# Patient Record
Sex: MALE | Race: BLACK OR AFRICAN AMERICAN | NOT HISPANIC OR LATINO | Employment: OTHER | URBAN - METROPOLITAN AREA
[De-identification: names, ages, dates, MRNs, and addresses within clinical notes are randomized per-mention and may not be internally consistent; named-entity substitution may affect disease eponyms.]

---

## 2023-11-02 PROBLEM — H90.8 MIXED CONDUCTIVE AND SENSORINEURAL HEARING LOSS: Status: ACTIVE | Noted: 2023-11-02

## 2023-11-02 PROBLEM — H90.A22 SENSORINEURAL HEARING LOSS (SNHL) OF LEFT EAR WITH RESTRICTED HEARING OF RIGHT EAR: Status: ACTIVE | Noted: 2023-11-02

## 2023-11-02 PROBLEM — I10 HYPERTENSION: Status: ACTIVE | Noted: 2023-10-05

## 2023-11-02 PROBLEM — H35.52: Status: ACTIVE | Noted: 2023-01-31

## 2023-11-02 PROBLEM — H92.10 OTORRHEA: Status: ACTIVE | Noted: 2023-11-02

## 2023-11-02 PROBLEM — H61.20 IMPACTED CERUMEN: Status: ACTIVE | Noted: 2023-11-02

## 2023-11-02 PROBLEM — H91.93 BILATERAL HEARING LOSS: Status: ACTIVE | Noted: 2023-11-02

## 2023-11-02 PROBLEM — H90.5 SENSORINEURAL HEARING LOSS (SNHL): Status: ACTIVE | Noted: 2023-11-02

## 2023-11-02 PROBLEM — H70.91: Status: ACTIVE | Noted: 2023-11-02

## 2023-11-02 PROBLEM — D48.5 NEOPLASM OF UNCERTAIN BEHAVIOR OF SKIN: Status: ACTIVE | Noted: 2018-12-11

## 2023-11-02 PROBLEM — H61.23 BILATERAL IMPACTED CERUMEN: Status: ACTIVE | Noted: 2023-11-02

## 2023-11-02 PROBLEM — L72.3 SEBACEOUS CYST: Status: ACTIVE | Noted: 2018-12-11

## 2023-11-02 PROBLEM — H90.71 MIXED HEARING LOSS OF RIGHT EAR: Status: ACTIVE | Noted: 2023-11-02

## 2023-11-02 PROBLEM — H95.191 ENCOUNTER FOR DEBRIDEMENT OF RIGHT POSTMASTOIDECTOMY CAVITY: Status: ACTIVE | Noted: 2023-11-02

## 2023-11-02 PROBLEM — H92.11 OTORRHEA, RIGHT: Status: ACTIVE | Noted: 2023-11-02

## 2023-11-02 RX ORDER — ATORVASTATIN CALCIUM 40 MG/1
1 TABLET, FILM COATED ORAL
COMMUNITY
Start: 2023-10-14

## 2023-11-02 RX ORDER — TAMSULOSIN HYDROCHLORIDE 0.4 MG/1
1 CAPSULE ORAL
COMMUNITY
Start: 2023-09-19

## 2023-11-02 RX ORDER — AMLODIPINE BESYLATE 5 MG/1
TABLET ORAL
COMMUNITY
Start: 2022-11-01

## 2023-11-02 RX ORDER — CHOLECALCIFEROL (VITAMIN D3) 50 MCG
TABLET ORAL
COMMUNITY
Start: 2018-08-02 | End: 2024-03-27 | Stop reason: WASHOUT

## 2023-11-02 RX ORDER — GABAPENTIN 300 MG/1
CAPSULE ORAL
COMMUNITY
Start: 2008-03-19 | End: 2024-03-27 | Stop reason: WASHOUT

## 2023-11-02 RX ORDER — DULOXETIN HYDROCHLORIDE 20 MG/1
CAPSULE, DELAYED RELEASE ORAL
COMMUNITY
Start: 2008-02-19 | End: 2023-11-07 | Stop reason: ALTCHOICE

## 2023-11-05 NOTE — PROGRESS NOTES
Subjective   Patient ID: Chavez Calle is a 43 y.o. male who presents for No chief complaint on file..  HPI  This 43-year-old male is being seen back in the office today for scheduled recheck of his ears for debridement of his right mastoid cavity as well as removal of cerumen from his left ear.  He does have a history of cholesteatoma in the past requiring surgical care.  He is seen at these intervals for debridement to minimize risk for infection.  He does have a mixed hearing loss on his right side and a sensorineural loss on his left.  No active signs of infection have occurred since his last visit.  His last audiogram was 1 year ago.  He is in a postop.  From septoplasty possible turbinate surgery by another ENT provider.  He has splints in place which should be taken out later today.  He was in the emergency room for epistaxis postoperatively.      Review of Systems   HENT:  Positive for hearing loss, nosebleeds and rhinorrhea.    Eyes:  Positive for visual disturbance.       Objective   Physical Exam  EXAMINATION:     GENERAL MIMI.EARANCE: Alert, in no acute distress, normal pitch and clarity of voice, well-developed and nourished, cooperative.     HEAD/FACE: Normocephalic, atraumatic, normal facial movements and strength, no no tenderness to palpation, no lesions noted.     SKIN: Normal turgor, no raised or ulcerative lesions, warm and dry to palpation.     EYES: Extraocular motions intact, no nystagmus noted, pupils equal and reactive to light and accommodation, no conjunctivitis.     EARS: Both external ears were within normal limits. Cerumen was noted in both ears with no signs of otorrhea. No mastoid tenderness was noted. See procedure note     NOSE: No external skin lesions are noted, nares are patent, septum is intact, sinuses are nontender to palpation bilaterally, no intranasal lesions or inflammation is noted, nasal valve is normal.     OROPHARYNX/ORAL CAVITY: Oropharynx is not inflamed and is  without lesions, mucosa of the oral cavity is intact and without lesions, tongue is midline and mobile, no acute dental disease is noted, TMJs are mobile     NECK: No lymphadenopathy is palpated, carotid pulses are intact, neck is supple with full range of motion, no thyroid abnormalities are noted, trachea is midline, no neck masses are palpated.     NEUROLOGIC/PSYCH; alert and oriented, cranial nerves are grossly intact, gait is without falling, no motor deficits are noted.    Patient ID: Chavez Calle is a 43 y.o. male.    Ear cerumen removal    Date/Time: 11/5/2023 8:57 AM    Performed by: Kyler Escoto DMD, MD  Authorized by: Kyler Escoto DMD, MD    Consent:     Consent obtained:  Verbal    Consent given by:  Patient    Risks discussed:  Pain and incomplete removal    Alternatives discussed:  No treatment and alternative treatment  Universal protocol:     Procedure explained and questions answered to patient or proxy's satisfaction: yes      Imaging studies available: no      Required blood products, implants, devices, and special equipment available: no      Patient identity confirmed:  Verbally with patient  Procedure details:     Location:  L ear and R ear    Procedure type: curette      Procedure type comment:  Or suction    Procedure outcomes: cerumen removed    Post-procedure details:     Inspection:  No bleeding and ear canal clear    Hearing quality:  Improved    Procedure completion:  Tolerated  Comments:      Microscopic examination of his right ear revealed evidence for significant ceruminous obstruction which was removed with wax loop.  The tympanic membrane was intact with no signs of middle ear disease.  On the left-hand side there was ceruminous debris obstructive to the mastoid cavity.  He status post a canal wall down mastoidectomy in the past.  This was removed with suction alligator forceps curved blunt hook.  There was a tympanic membrane intact.    His audiogram today on the left  ear continues to show a mostly mild sensorineural hearing loss in the mid upper frequencies of sound.  And on the right-hand side there was a moderate mixed hearing loss in the mid to upper frequencies of sound.  Excellent discrimination was still noted in both ears.  No change was noted    Assessment/Plan   Problem List Items Addressed This Visit             ICD-10-CM    Encounter for debridement of right postmastoidectomy cavity - Primary H95.191    Mixed hearing loss of right ear H90.71    Sensorineural hearing loss (SNHL) of left ear with restricted hearing of right ear H90.A22    Impacted cerumen H61.20     I discussed the findings with the patient. Do to the history of cerumen impactions, avoidance of Q tip use and water contamination is advised. Periodic check ups in the office or with the PCP are advised and if recurrent obstructions are noted a scheduled cleaning schedule will be maintained. Ear pain, otorhea, changes in hearing should be reported to the office. For some the use of debrox or baby oil can be helpful as long as lorri TM is intact and not perforated.    I discussed the present hearing test findings with the patient. Since the last test there has been no significant change in the hearing of individual frequencies sound. Discrimination ability remains basically unchanged. It would be advised that a yearly audiogram be done unless symptoms develop in regards to progressive loss, new onset vertigo, or changes regarding tinnitus. Avoidance of loud noise without ear protection is advised. Rehabilitation using hearing aids can at some point be of benefit to him but at the present time he seems to be functioning around this loss.  Recheck in 6 months is recommended.

## 2023-11-07 ENCOUNTER — CLINICAL SUPPORT (OUTPATIENT)
Dept: AUDIOLOGY | Facility: CLINIC | Age: 43
End: 2023-11-07
Payer: MEDICARE

## 2023-11-07 ENCOUNTER — OFFICE VISIT (OUTPATIENT)
Dept: OTOLARYNGOLOGY | Facility: CLINIC | Age: 43
End: 2023-11-07
Payer: MEDICARE

## 2023-11-07 DIAGNOSIS — H90.3 SENSORINEURAL HEARING LOSS, BILATERAL: Primary | ICD-10-CM

## 2023-11-07 DIAGNOSIS — H90.A22 SENSORINEURAL HEARING LOSS (SNHL) OF LEFT EAR WITH RESTRICTED HEARING OF RIGHT EAR: ICD-10-CM

## 2023-11-07 DIAGNOSIS — H90.A31 MIXED CONDUCTIVE AND SENSORINEURAL HEARING LOSS OF RIGHT EAR WITH RESTRICTED HEARING OF LEFT EAR: ICD-10-CM

## 2023-11-07 DIAGNOSIS — H95.191 ENCOUNTER FOR DEBRIDEMENT OF RIGHT POSTMASTOIDECTOMY CAVITY: Primary | ICD-10-CM

## 2023-11-07 DIAGNOSIS — H61.23 BILATERAL IMPACTED CERUMEN: ICD-10-CM

## 2023-11-07 DIAGNOSIS — H61.22 IMPACTED CERUMEN OF LEFT EAR: ICD-10-CM

## 2023-11-07 PROCEDURE — 92557 COMPREHENSIVE HEARING TEST: CPT | Performed by: AUDIOLOGIST

## 2023-11-07 PROCEDURE — 3079F DIAST BP 80-89 MM HG: CPT | Performed by: OTOLARYNGOLOGY

## 2023-11-07 PROCEDURE — 4004F PT TOBACCO SCREEN RCVD TLK: CPT | Performed by: OTOLARYNGOLOGY

## 2023-11-07 PROCEDURE — 99214 OFFICE O/P EST MOD 30 MIN: CPT | Performed by: OTOLARYNGOLOGY

## 2023-11-07 PROCEDURE — 69222 CLEAN OUT MASTOID CAVITY: CPT | Performed by: OTOLARYNGOLOGY

## 2023-11-07 PROCEDURE — 69210 REMOVE IMPACTED EAR WAX UNI: CPT | Performed by: OTOLARYNGOLOGY

## 2023-11-07 PROCEDURE — 3074F SYST BP LT 130 MM HG: CPT | Performed by: OTOLARYNGOLOGY

## 2023-11-07 RX ORDER — TRAZODONE HYDROCHLORIDE 50 MG/1
50 TABLET ORAL NIGHTLY PRN
COMMUNITY
Start: 2023-01-18

## 2023-11-07 RX ORDER — DULOXETIN HYDROCHLORIDE 30 MG/1
30 CAPSULE, DELAYED RELEASE ORAL DAILY
COMMUNITY
Start: 2023-10-27 | End: 2024-03-27 | Stop reason: WASHOUT

## 2023-11-07 ASSESSMENT — ENCOUNTER SYMPTOMS: RHINORRHEA: 1

## 2023-11-07 NOTE — PROGRESS NOTES
COMPREHENSIVE AUDIOMETRIC EVALUATION      Name:  Chavez Calle  :  1980  Age:  43 y.o.  Date of Evaluation:  23   Referring Provider:  Dr. Escoto     History:  Mr. Calle was seen today for an evaluation of hearing.  Please recall, patient has been a patient of our clinic for monitoring of asymmetric high frequency thresholds.  Patient denied any new concerns     OTOSCOPY: Did not conduct as immediately following Dr. Escoto cerumen removal    AUDIOMETRIC EVALUATION (Phones):       Right Ear: Mild sloping to Moderately severe Sensorineural hearing loss                 Left Ear: Normal sloping to Mild Sensorineural hearing loss         NOTE: Hearing testing consistent with most recent audiometric evaluation 2022; Clinically significant high frequency asymmetric SNHL    Test technique:  Standard Audiometry  Reliability:   good    SPEECH RECOGNITION THRESHOLD:       Right Ear:  20 dBHL in fair with PTA       Left Ear:  20 dBHL in good with PTA    WORD RECOGNITION:       Right Ear:  excellent (96%) at elevated presentation level       Left Ear:  excellent (100%) at normal presentation level    RECOMMENDATIONS:  -Recommend further investigation into asymmetric hearing sensitivity has this not already been conducted  -Recommend patient be seen for HAE for the right ear following medical clearance  -Recommend patient return for audiometric evaluation in approximately a year or sooner should concerns arise.    Heron Singh, CCC-A     Appt: 10:30 - 11:00 AM

## 2023-12-11 PROCEDURE — 88304 TISSUE EXAM BY PATHOLOGIST: CPT | Performed by: PATHOLOGY

## 2023-12-11 PROCEDURE — 88304 TISSUE EXAM BY PATHOLOGIST: CPT

## 2023-12-11 PROCEDURE — 0752T DGTZ GLS MCRSCP SLD LVL III: CPT

## 2023-12-13 ENCOUNTER — LAB REQUISITION (OUTPATIENT)
Dept: LAB | Facility: HOSPITAL | Age: 43
End: 2023-12-13
Payer: MEDICARE

## 2023-12-13 DIAGNOSIS — G47.33 OBSTRUCTIVE SLEEP APNEA (ADULT) (PEDIATRIC): ICD-10-CM

## 2023-12-21 LAB
LABORATORY COMMENT REPORT: NORMAL
PATH REPORT.FINAL DX SPEC: NORMAL
PATH REPORT.GROSS SPEC: NORMAL
PATH REPORT.TOTAL CANCER: NORMAL

## 2024-03-23 NOTE — PROGRESS NOTES
Subjective   Patient ID: Chavez Calle is a 43 y.o. male who presents for No chief complaint on file..  HPI  This 43-year-old male is being seen back in the office today for scheduled recheck of his ears for debridement of his right mastoid cavity as well as removal of cerumen from his left ear. He does have a history of cholesteatoma in the past requiring surgical care. He is seen at these intervals for debridement to minimize risk for infection. He does have a mixed hearing loss on his right side and a sensorineural loss on his left. No active signs of infection have occurred since his last visit. He did in the fall 2023 undergo a septoplasty and turbinate reduction by another provider.  He is also scheduled for surgery for inspire to treat his sleep apnea.  He is having no otologic issues.  His hearing was last checked in November of last year.  Review of Systems  Current review of systems  Active Ambulatory Problems     Diagnosis Date Noted    Bilateral impacted cerumen 11/02/2023    Cholesteatoma of middle ear and mastoid 03/03/2008    Chronic mastoiditis 02/19/2008    Encounter for debridement of right postmastoidectomy cavity 11/02/2023    Infection of mastoid bowl, right 11/02/2023    Mixed hearing loss of right ear 11/02/2023    Neoplasm of uncertain behavior of skin 12/11/2018    Otorrhea 11/02/2023    Sebaceous cyst 12/11/2018    Sensorineural hearing loss (SNHL) of left ear with restricted hearing of right ear 11/02/2023    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    Hypertension 10/05/2023    Impacted cerumen 11/02/2023    Mixed conductive and sensorineural hearing loss 11/02/2023    Sensorineural hearing loss (SNHL) 11/02/2023    Bilateral hearing loss 11/02/2023     Resolved Ambulatory Problems     Diagnosis Date Noted    No Resolved Ambulatory Problems     Past Medical History:   Diagnosis Date    Personal history of other diseases of the respiratory system      Personal history of other drug therapy            Current Outpatient Medications:     amLODIPine (Norvasc) 5 mg tablet, 1 tablet Orally Once a day for 30 days, Disp: , Rfl:     atorvastatin (Lipitor) 40 mg tablet, Take 1 tablet (40 mg) by mouth once daily., Disp: , Rfl:     buPROPion (Wellbutrin) 37.5 MG split tablet, Take 2 half tablet (75 mg) by mouth., Disp: , Rfl:     cholecalciferol (Vitamin D-3) 50 MCG (2000 UT) tablet, Take by mouth., Disp: , Rfl:     DULoxetine (Cymbalta) 30 mg DR capsule, Take 1 capsule (30 mg) by mouth once daily., Disp: , Rfl:     gabapentin (Neurontin) 300 mg capsule, Take by mouth., Disp: , Rfl:     OMEGA-3 ACID ETHYL ESTERS ORAL, Take by mouth., Disp: , Rfl:     tamsulosin (Flomax) 0.4 mg 24 hr capsule, Take 1 capsule (0.4 mg) by mouth once daily., Disp: , Rfl:     traZODone (Desyrel) 50 mg tablet, Take 1 tablet (50 mg) by mouth as needed at bedtime for sleep., Disp: , Rfl:      Social History     Tobacco Use    Smoking status: Every Day     Types: Cigarettes    Smokeless tobacco: Never   Substance Use Topics    Alcohol use: Not Currently        Past Surgical History:   Procedure Laterality Date    OTHER SURGICAL HISTORY  04/19/2016    Ear Surgery    OTHER SURGICAL HISTORY  04/19/2016    Oral Surgery      There were no vitals taken for this visit.   Objective   Physical Exam  EXAMINATION:     GENERAL MIMI.EARANCE: Alert, in no acute distress, normal pitch and clarity of voice, well-developed and nourished, cooperative.     HEAD/FACE: Normocephalic, atraumatic, normal facial movements and strength, no no tenderness to palpation, no lesions noted.     SKIN: Normal turgor, no raised or ulcerative lesions, warm and dry to palpation.     EYES: Extraocular motions intact, no nystagmus noted, pupils equal and reactive to light and accommodation, no conjunctivitis.     EARS: Both external ears were within normal limits. Cerumen was noted in both ears with no signs of otorrhea. No mastoid  tenderness was noted. See procedure note     NOSE: No external skin lesions are noted, nares are patent, septum is intact, sinuses are nontender to palpation bilaterally, no intranasal lesions or inflammation is noted, nasal valve is normal.     OROPHARYNX/ORAL CAVITY: Oropharynx is not inflamed and is without lesions, mucosa of the oral cavity is intact and without lesions, tongue is midline and mobile, no acute dental disease is noted, TMJs are mobile     NECK: No lymphadenopathy is palpated, carotid pulses are intact, neck is supple with full range of motion, no thyroid abnormalities are noted, trachea is midline, no neck masses are palpated.     NEUROLOGIC/PSYCH; alert and oriented, cranial nerves are grossly intact, gait is without falling, no motor deficits are noted.    Patient ID: Chavez Calle is a 43 y.o. male.    Ear cerumen removal    Date/Time: 3/27/2024 9:09 AM    Performed by: Kyler Escoto DMD, MD  Authorized by: Kyler Escoto DMD, MD    Consent:     Consent obtained:  Verbal    Consent given by:  Patient    Risks discussed:  Pain and incomplete removal    Alternatives discussed:  No treatment and alternative treatment  Universal protocol:     Procedure explained and questions answered to patient or proxy's satisfaction: yes      Imaging studies available: no      Required blood products, implants, devices, and special equipment available: no      Patient identity confirmed:  Verbally with patient  Procedure details:     Location:  L ear and R ear    Procedure type: curette      Procedure type comment:  Or suction    Procedure outcomes: cerumen removed    Post-procedure details:     Inspection:  No bleeding and ear canal clear    Hearing quality:  Improved    Procedure completion:  Tolerated well, no immediate complications  Comments:      Microscopic evaluation of the right ear was done.  There was heavy ceruminous buildup noted within the mastoid cavity and in the anterior aspect of the  ear.  This was debrided using curette primarily.  Alligator forceps was utilized as well.  The mastoid cavity was clean with no otorrhea.  The anterior aspect of the cavity over the facial ridge was without abnormalities.  There is a tympanic membrane present with no signs of acute inflammation.  On the left-hand side cerumen is buildup is noted obstructed to the ear which is removed with a curette.  The tympanic membrane was intact.    Assessment/Plan   Problem List Items Addressed This Visit             ICD-10-CM    Bilateral impacted cerumen - Primary H61.23    Encounter for debridement of right postmastoidectomy cavity H95.191    Mixed hearing loss of right ear H90.71    Sensorineural hearing loss (SNHL) of left ear with restricted hearing of right ear H90.A22     I discussed the clinical finds the patient.  He should stay in a 6-month checkup schedule due to his tendency for obstruction in the mastoid cavity on the right.  He had no signs of otorrhea or difficulties with infections.  He is still actively undergoing treatments for his sleep apnea and is scheduled for inspire placement in the near future.  His hearing test is up-to-date and has had no changes noted subjectively.  Recheck in 6 months is recommended sooner if he has any drainage problems or difficulties with pain or sudden changes in hearing.       Kyler Escoto DMD, MD 03/23/24 9:59 AM

## 2024-03-27 ENCOUNTER — CLINICAL SUPPORT (OUTPATIENT)
Dept: AUDIOLOGY | Facility: CLINIC | Age: 44
End: 2024-03-27
Payer: MEDICARE

## 2024-03-27 ENCOUNTER — OFFICE VISIT (OUTPATIENT)
Dept: OTOLARYNGOLOGY | Facility: CLINIC | Age: 44
End: 2024-03-27
Payer: MEDICARE

## 2024-03-27 VITALS — BODY MASS INDEX: 31.16 KG/M2 | WEIGHT: 256 LBS

## 2024-03-27 DIAGNOSIS — H90.A22 SENSORINEURAL HEARING LOSS (SNHL) OF LEFT EAR WITH RESTRICTED HEARING OF RIGHT EAR: ICD-10-CM

## 2024-03-27 DIAGNOSIS — H95.191 ENCOUNTER FOR DEBRIDEMENT OF RIGHT POSTMASTOIDECTOMY CAVITY: ICD-10-CM

## 2024-03-27 DIAGNOSIS — H90.A31 MIXED CONDUCTIVE AND SENSORINEURAL HEARING LOSS OF RIGHT EAR WITH RESTRICTED HEARING OF LEFT EAR: ICD-10-CM

## 2024-03-27 DIAGNOSIS — H61.23 BILATERAL IMPACTED CERUMEN: Primary | ICD-10-CM

## 2024-03-27 PROCEDURE — 69210 REMOVE IMPACTED EAR WAX UNI: CPT | Performed by: OTOLARYNGOLOGY

## 2024-03-27 PROCEDURE — 99213 OFFICE O/P EST LOW 20 MIN: CPT | Performed by: OTOLARYNGOLOGY

## 2024-03-27 PROCEDURE — 69220 CLEAN OUT MASTOID CAVITY: CPT | Performed by: OTOLARYNGOLOGY

## 2024-03-27 RX ORDER — DULOXETINE 40 MG/1
40 CAPSULE, DELAYED RELEASE ORAL DAILY
COMMUNITY
Start: 2024-03-04

## 2024-03-27 ASSESSMENT — PATIENT HEALTH QUESTIONNAIRE - PHQ9
SUM OF ALL RESPONSES TO PHQ9 QUESTIONS 1 AND 2: 0
1. LITTLE INTEREST OR PLEASURE IN DOING THINGS: NOT AT ALL
2. FEELING DOWN, DEPRESSED OR HOPELESS: NOT AT ALL

## 2024-03-27 NOTE — PROGRESS NOTES
Patient was scheduled for audiometric evaluation today, however, Dr. Escoto and patient discussed and decided to defer evaluation. Patient was not seen for audiometric evaluation today.    Heron Singh, CCC-A

## 2024-05-06 NOTE — PROGRESS NOTES
Subjective   Patient ID: Chavez Calle is a 43 y.o. male who presents for No chief complaint on file..  HPI  This 43-year-old male is being seen back in the office today for scheduled recheck of his ears for debridement of his right mastoid cavity as well as removal of cerumen from his left ear. He does have a history of cholesteatoma in the past requiring surgical care. He is seen at these intervals for debridement to minimize risk for infection. He does have a mixed hearing loss on his right side and a sensorineural loss on his left. No active signs of infection have occurred since his last visit. He did in the fall 2023 undergo a septoplasty and turbinate reduction by another provider.  He is also scheduled for surgery for inspire to treat his sleep apnea.  He is having no otologic issues.  His hearing was last checked in November of last year.     Review of Systems  A 12 point ROS has been reviewed and are negative for complaint except what is stated in the history of present illness and/or past medical history as noted in the EMR  Active Ambulatory Problems     Diagnosis Date Noted    Bilateral impacted cerumen 11/02/2023    Cholesteatoma of middle ear and mastoid 03/03/2008    Chronic mastoiditis 02/19/2008    Encounter for debridement of right postmastoidectomy cavity 11/02/2023    Infection of mastoid bowl, right 11/02/2023    Mixed hearing loss of right ear 11/02/2023    Neoplasm of uncertain behavior of skin 12/11/2018    Otorrhea 11/02/2023    Sebaceous cyst 12/11/2018    Sensorineural hearing loss (SNHL) of left ear with restricted hearing of right ear 11/02/2023    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    Hypertension 10/05/2023    Impacted cerumen 11/02/2023    Mixed conductive and sensorineural hearing loss 11/02/2023    Sensorineural hearing loss (SNHL) 11/02/2023    Bilateral hearing loss 11/02/2023     Resolved Ambulatory Problems     Diagnosis Date Noted     No Resolved Ambulatory Problems     Past Medical History:   Diagnosis Date    Personal history of other diseases of the respiratory system     Personal history of other drug therapy           Current Outpatient Medications:     amLODIPine (Norvasc) 5 mg tablet, 1 tablet Orally Once a day for 30 days, Disp: , Rfl:     atorvastatin (Lipitor) 40 mg tablet, Take 1 tablet (40 mg) by mouth once daily., Disp: , Rfl:     DULoxetine 40 mg DR capsule, Take 1 capsule (40 mg) by mouth once daily., Disp: , Rfl:     tamsulosin (Flomax) 0.4 mg 24 hr capsule, Take 1 capsule (0.4 mg) by mouth once daily., Disp: , Rfl:     traZODone (Desyrel) 50 mg tablet, Take 1 tablet (50 mg) by mouth as needed at bedtime for sleep., Disp: , Rfl:      Social History     Tobacco Use    Smoking status: Every Day     Types: Cigarettes    Smokeless tobacco: Never   Substance Use Topics    Alcohol use: Not Currently        No Known Allergies   Objective   Physical Exam  EXAMINATION:     GENERAL MIMI.EARANCE: Alert, in no acute distress, normal pitch and clarity of voice, well-developed and nourished, cooperative.     HEAD/FACE: Normocephalic, atraumatic, normal facial movements and strength, no no tenderness to palpation, no lesions noted.     SKIN: Normal turgor, no raised or ulcerative lesions, warm and dry to palpation.     EYES: Extraocular motions intact, no nystagmus noted, pupils equal and reactive to light and accommodation, no conjunctivitis.     EARS: Both external ears were within normal limits. Cerumen was noted in both ears with no signs of otorrhea. No mastoid tenderness was noted. See procedure note     NOSE: No external skin lesions are noted, nares are patent, septum is intact, sinuses are nontender to palpation bilaterally, no intranasal lesions or inflammation is noted, nasal valve is normal.     OROPHARYNX/ORAL CAVITY: Oropharynx is not inflamed and is without lesions, mucosa of the oral cavity is intact and without lesions, tongue  is midline and mobile, no acute dental disease is noted, TMJs are mobile     NECK: No lymphadenopathy is palpated, carotid pulses are intact, neck is supple with full range of motion, no thyroid abnormalities are noted, trachea is midline, no neck masses are palpated.     NEUROLOGIC/PSYCH; alert and oriented, cranial nerves are grossly intact, gait is without falling, no motor deficits are noted.    Patient ID: Chavez Calle is a 43 y.o. male.    Procedures  Assessment/Plan   {Assess/PlanSmartLinks:[id number]}         Kyler Escoto DMD, MD 05/06/24 2:25 PM

## 2024-05-13 ENCOUNTER — APPOINTMENT (OUTPATIENT)
Dept: OTOLARYNGOLOGY | Facility: CLINIC | Age: 44
End: 2024-05-13
Payer: MEDICARE

## 2024-05-13 DIAGNOSIS — H90.A31 MIXED CONDUCTIVE AND SENSORINEURAL HEARING LOSS OF RIGHT EAR WITH RESTRICTED HEARING OF LEFT EAR: ICD-10-CM

## 2024-05-13 DIAGNOSIS — H61.23 BILATERAL IMPACTED CERUMEN: Primary | ICD-10-CM

## 2024-05-13 DIAGNOSIS — H95.191 ENCOUNTER FOR DEBRIDEMENT OF RIGHT POSTMASTOIDECTOMY CAVITY: ICD-10-CM

## 2024-05-13 DIAGNOSIS — H90.A22 SENSORINEURAL HEARING LOSS (SNHL) OF LEFT EAR WITH RESTRICTED HEARING OF RIGHT EAR: ICD-10-CM

## 2024-09-27 NOTE — PROGRESS NOTES
Subjective   Patient ID: Chavez Calle is a 43 y.o. male who presents for No chief complaint on file..  HPI  This 43-year-old male is being seen back in the office today for scheduled recheck of his ears for debridement of his right mastoid cavity as well as removal of cerumen from his left ear. He does have a history of cholesteatoma in the past requiring surgical care. He is seen at these intervals for debridement to minimize risk for infection. He does have a mixed hearing loss on his right side and a sensorineural loss on his left. No active signs of infection have occurred since his last visit.  He is status post nasal surgery by another provider and he was assessed for inspire for treatment of sleep apnea.  Review of Systems   A 12 point ROS  has been reviewed and are negative for complaint except for what is stated in the history of present illness and /or for past medical history as noted in the EMR.     Past Medical History:   Diagnosis Date    Personal history of other diseases of the respiratory system     History of asthma    Personal history of other drug therapy     COVID-19 vaccine series completed          Current Outpatient Medications:     amLODIPine (Norvasc) 5 mg tablet, 1 tablet Orally Once a day for 30 days, Disp: , Rfl:     atorvastatin (Lipitor) 40 mg tablet, Take 1 tablet (40 mg) by mouth once daily., Disp: , Rfl:     DULoxetine 40 mg DR capsule, Take 1 capsule (40 mg) by mouth once daily., Disp: , Rfl:     tamsulosin (Flomax) 0.4 mg 24 hr capsule, Take 1 capsule (0.4 mg) by mouth once daily., Disp: , Rfl:     traZODone (Desyrel) 50 mg tablet, Take 1 tablet (50 mg) by mouth as needed at bedtime for sleep., Disp: , Rfl:      Social History     Tobacco Use    Smoking status: Every Day     Types: Cigarettes    Smokeless tobacco: Never   Substance Use Topics    Alcohol use: Not Currently       No Known Allergies    There were no vitals taken for this visit.    Objective   Physical Exam  GENERAL  MIMI.EARANCE: Alert, in no acute distress, normal pitch and clarity of voice, well-developed and nourished, cooperative.     HEAD/FACE: Normocephalic, atraumatic, normal facial movements and strength, no no tenderness to palpation, no lesions noted.     SKIN: Normal turgor, no raised or ulcerative lesions, warm and dry to palpation.     EYES: Extraocular motions intact, no nystagmus noted, pupils equal and reactive to light and accommodation, no conjunctivitis.     EARS: Both external ears were within normal limits. Cerumen was noted in both ears with no signs of otorrhea. No mastoid tenderness was noted. See procedure note     NOSE: See procedure note    OROPHARYNX/ORAL CAVITY: Oropharynx is not inflamed and is without lesions, mucosa of the oral cavity is intact and without lesions, tongue is midline and mobile, no acute dental disease is noted, TMJs are mobile     NECK: No lymphadenopathy is palpated, carotid pulses are intact, neck is supple with full range of motion, no thyroid abnormalities are noted, trachea is midline, no neck masses are palpated.     NEUROLOGIC/PSYCH; alert and oriented, cranial nerves are grossly intact, gait is without falling, no motor deficits are noted.    Patient ID: Chavez Calle is a 43 y.o. male.    Ear cerumen removal    Date/Time: 10/1/2024 11:59 AM    Performed by: Kyler Escoto DMD, MD  Authorized by: Kyler Escoto DMD, MD    Consent:     Consent obtained:  Verbal    Consent given by:  Patient    Risks discussed:  Pain and incomplete removal    Alternatives discussed:  No treatment and alternative treatment  Universal protocol:     Procedure explained and questions answered to patient or proxy's satisfaction: yes      Imaging studies available: no      Required blood products, implants, devices, and special equipment available: no      Patient identity confirmed:  Verbally with patient  Procedure details:     Location:  L ear and R ear    Procedure type: curette       Procedure type comment:  Or suction    Procedure outcomes: cerumen removed    Post-procedure details:     Inspection:  No bleeding and ear canal clear    Hearing quality:  Improved    Procedure completion:  Tolerated well, no immediate complications  Comments:      Microscopic evaluation was done in both ears.  On the right-hand side he has a canal wall down mastoid cavity in which there was ceruminous buildup.  This was removed with a curette and 90 degree ball probe.  The cerumen was removed completely and there was no signs of otorrhea or signs of inflammation.  There is a tympanic membrane still present with no signs of inflammation or middle ear disease.  On the left-hand side there was ceruminous debris obstructive to the ear canal which was removed primarily with a curette.  The tympanic membrane was intact with no signs of middle ear disease.       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  Ceruminous debris is removed in both ears especially from the mastoid cavity on the right.  There is no sign of acute inflammation or infection.  He should remain on a 4-month checkup schedule and at his next visit an audiogram should be obtained.  He should wear his hearing aids to improve his function.  He seems to have had a good response to treatment of his sleep apnea with inspire.  The passing difficulties prior to his scheduled visit he should return to the office for an earlier visit.       Kyler Escoto DMD, MD 09/27/24 9:48 AM

## 2024-10-01 ENCOUNTER — APPOINTMENT (OUTPATIENT)
Dept: OTOLARYNGOLOGY | Facility: CLINIC | Age: 44
End: 2024-10-01
Payer: MEDICARE

## 2024-10-01 VITALS — WEIGHT: 256 LBS | BODY MASS INDEX: 31.17 KG/M2 | HEIGHT: 76 IN

## 2024-10-01 DIAGNOSIS — H95.191 ENCOUNTER FOR DEBRIDEMENT OF RIGHT POSTMASTOIDECTOMY CAVITY: ICD-10-CM

## 2024-10-01 DIAGNOSIS — H61.23 BILATERAL IMPACTED CERUMEN: Primary | ICD-10-CM

## 2024-10-01 DIAGNOSIS — H90.A31 MIXED CONDUCTIVE AND SENSORINEURAL HEARING LOSS OF RIGHT EAR WITH RESTRICTED HEARING OF LEFT EAR: ICD-10-CM

## 2024-10-01 DIAGNOSIS — H90.A22 SENSORINEURAL HEARING LOSS (SNHL) OF LEFT EAR WITH RESTRICTED HEARING OF RIGHT EAR: ICD-10-CM

## 2024-10-01 PROCEDURE — 69222 CLEAN OUT MASTOID CAVITY: CPT | Performed by: OTOLARYNGOLOGY

## 2024-10-01 PROCEDURE — 3008F BODY MASS INDEX DOCD: CPT | Performed by: OTOLARYNGOLOGY

## 2024-10-01 PROCEDURE — 99213 OFFICE O/P EST LOW 20 MIN: CPT | Performed by: OTOLARYNGOLOGY

## 2024-10-01 PROCEDURE — 69210 REMOVE IMPACTED EAR WAX UNI: CPT | Performed by: OTOLARYNGOLOGY

## 2025-01-31 NOTE — PROGRESS NOTES
Subjective   Patient ID: Chavez Calle is a 44 y.o. male who presents for No chief complaint on file..  HPI  This 44-year-old male is being seen back in the office today for scheduled recheck of his ears for debridement of his right mastoid cavity as well as removal of cerumen from his left ear. He does have a history of cholesteatoma in the past requiring surgical care. He is seen at these intervals for debridement to minimize risk for infection. He does have a mixed hearing loss on his right side and a sensorineural loss on his left. No active signs of infection have occurred since his last visit.  He is status post nasal surgery by another provider and he was assessed for inspire for treatment of sleep apnea.   Review of Systems   A 12 point ROS  has been reviewed and are negative for complaint except for what is stated in the history of present illness and /or for past medical history as noted in the EMR.    Past Medical History:   Diagnosis Date    Personal history of other diseases of the respiratory system     History of asthma    Personal history of other drug therapy     COVID-19 vaccine series completed          Current Outpatient Medications:     amLODIPine (Norvasc) 5 mg tablet, 1 tablet Orally Once a day for 30 days, Disp: , Rfl:     atorvastatin (Lipitor) 40 mg tablet, Take 1 tablet (40 mg) by mouth once daily., Disp: , Rfl:     DULoxetine 40 mg DR capsule, Take 1 capsule (40 mg) by mouth once daily., Disp: , Rfl:     tamsulosin (Flomax) 0.4 mg 24 hr capsule, Take 1 capsule (0.4 mg) by mouth once daily., Disp: , Rfl:     traZODone (Desyrel) 50 mg tablet, Take 1 tablet (50 mg) by mouth as needed at bedtime for sleep., Disp: , Rfl:      Past Surgical History:   Procedure Laterality Date    OTHER SURGICAL HISTORY  04/19/2016    Ear Surgery    OTHER SURGICAL HISTORY  04/19/2016    Oral Surgery        Social History     Tobacco Use    Smoking status: Every Day     Types: Cigarettes    Smokeless tobacco:  Never   Substance Use Topics    Alcohol use: Not Currently       No Known Allergies    There were no vitals taken for this visit.    Objective   Physical Exam  GENERAL MIMI.EARANCE: Alert, in no acute distress, normal pitch and clarity of voice, well-developed and nourished, cooperative.     HEAD/FACE: Normocephalic, atraumatic, normal facial movements and strength, no no tenderness to palpation, no lesions noted.     SKIN: Normal turgor, no raised or ulcerative lesions, warm and dry to palpation.     EYES: Extraocular motions intact, no nystagmus noted, pupils equal and reactive to light and accommodation, no conjunctivitis.     EARS: Both external ears were within normal limits. Cerumen was noted in both ears with no signs of otorrhea. No mastoid tenderness was noted. See procedure note     NOSE: See procedure note     OROPHARYNX/ORAL CAVITY: Oropharynx is not inflamed and is without lesions, mucosa of the oral cavity is intact and without lesions, tongue is midline and mobile, no acute dental disease is noted, TMJs are mobile     NECK: No lymphadenopathy is palpated, carotid pulses are intact, neck is supple with full range of motion, no thyroid abnormalities are noted, trachea is midline, no neck masses are palpated.     NEUROLOGIC/PSYCH; alert and oriented, cranial nerves are grossly intact, gait is without falling, no motor deficits are noted.       Patient ID: Chavez Calle is a 44 y.o. male.    Binocular microscopy    Date/Time: 2/5/2025 11:23 AM    Performed by: Kyler Escoto DMD, MD  Authorized by: Kyler Escoto DMD, MD    Consent:     Consent obtained:  Verbal    Consent given by:  Patient    Risks discussed:  Pain    Alternatives discussed:  No treatment and observation  Procedure details:     Indications: debridement  (Mastoid debridement of right ear cerumen removal left)  Post-procedure details:     Patient tolerance of procedure:  Tolerated well, no immediate complications  Comments:       Microscopic evaluation of both ears was done.  On the right-hand side there was a significant amount of ceruminous debris obstructive to the ear canal and the mastoid bowl.  This was removed with commendation of curette and alligator forceps.  Once removed there was no signs of otorrhea or inflammation.  Boric acid powder was applied.  On the left-hand side slight narrowness to the ear canal is noted with significant obstructive ceruminous debris which was removed with a wax loop primarily.  The tympanic membrane was intact there was no signs of inflammation.    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  His exam was negative for signs of inflammation in the ears in particular.  His nasal exam did not show evidence for purulent nasal discharge and the septum is midline.  His nasal obstruction that he still feels is likely secondary to his turbinate fluctuation in size.  This could also be secondary to smoking.  He is going to see his other provider to have that checked out further.    The standpoint of his ears there is no signs of infections or inflammation.  It is feasible that the low tone changes in hearing are secondary to the degree of cerumen buildup since it was significant and likely the test should have been done after debridement.    I discussed the present hearing test findings with the patient. Since the last test there has been some decrease in the low tone areas in both ears which could have been secondary to the degree of cerumen buildup in the ears.. Discrimination ability  remains basically unchanged. It would be advised that a yearly audiogram be done unless symptoms develop in regards to progressive loss, new onset vertigo, or changes regarding tinnitus. Avoidance of loud noise without ear protection is advised.  He should was contact the office if there is any drainage from his ears or discomfort.  Hearing should be rechecked in 1 year and at that time the test should be done after ear debridement.    From the standpoint of his follow-ups he should be seen in 6-month intervals for debridement of the mastoid bowl and removal of cerumen.         Kyler Escoto DMD, MD 01/31/25 9:40 AM

## 2025-02-05 ENCOUNTER — APPOINTMENT (OUTPATIENT)
Dept: AUDIOLOGY | Facility: CLINIC | Age: 45
End: 2025-02-05
Payer: MEDICARE

## 2025-02-05 ENCOUNTER — APPOINTMENT (OUTPATIENT)
Dept: OTOLARYNGOLOGY | Facility: CLINIC | Age: 45
End: 2025-02-05
Payer: MEDICARE

## 2025-02-05 VITALS — BODY MASS INDEX: 31.17 KG/M2 | HEIGHT: 76 IN | WEIGHT: 256 LBS

## 2025-02-05 DIAGNOSIS — H95.191 ENCOUNTER FOR DEBRIDEMENT OF RIGHT POSTMASTOIDECTOMY CAVITY: ICD-10-CM

## 2025-02-05 DIAGNOSIS — H90.A31 MIXED CONDUCTIVE AND SENSORINEURAL HEARING LOSS OF RIGHT EAR WITH RESTRICTED HEARING OF LEFT EAR: ICD-10-CM

## 2025-02-05 DIAGNOSIS — H61.23 BILATERAL IMPACTED CERUMEN: Primary | ICD-10-CM

## 2025-02-05 DIAGNOSIS — H71.20 CHOLESTEATOMA OF MIDDLE EAR AND MASTOID, UNSPECIFIED LATERALITY: Primary | ICD-10-CM

## 2025-02-05 DIAGNOSIS — Z72.0 TOBACCO ABUSE: ICD-10-CM

## 2025-02-05 DIAGNOSIS — H90.A22 SENSORINEURAL HEARING LOSS (SNHL) OF LEFT EAR WITH RESTRICTED HEARING OF RIGHT EAR: ICD-10-CM

## 2025-02-05 PROCEDURE — 99214 OFFICE O/P EST MOD 30 MIN: CPT | Performed by: OTOLARYNGOLOGY

## 2025-02-05 PROCEDURE — 3008F BODY MASS INDEX DOCD: CPT | Performed by: OTOLARYNGOLOGY

## 2025-02-05 PROCEDURE — 69220 CLEAN OUT MASTOID CAVITY: CPT | Performed by: OTOLARYNGOLOGY

## 2025-02-05 PROCEDURE — 69210 REMOVE IMPACTED EAR WAX UNI: CPT | Performed by: OTOLARYNGOLOGY

## 2025-02-05 PROCEDURE — 92557 COMPREHENSIVE HEARING TEST: CPT | Performed by: AUDIOLOGIST

## 2025-02-05 PROCEDURE — 92567 TYMPANOMETRY: CPT | Performed by: AUDIOLOGIST

## 2025-02-05 NOTE — PROGRESS NOTES
"AUDIOMETRIC EVALUATION       Name:  Chavez Calle  :  1980  Age:  44 y.o.  Date of Evaluation:  2025     HISTORY  Chavez Calle was seen today for a hearing evaluation due to previous mastoidectomy with cholesteatoma removal in the right ear. Does require debridement often. Known bilateral sensorineural hearing loss hearing loss (see audio from ) and long standing tinnitus that is not bothersome.    Denies vertigo, aural pain, drainage, fullness, history of familial hearing loss or noise exposure.    PROCEDURE:  Otoscopic Evaluation:    RIGHT: Debris in ear canal and tympanic membrane visualized. Surgical ear.  LEFT:  Debris in ear canal and tympanic membrane visualized.    Immittance: Tympanometry (226 Hz probe tone) and Stapedial Acoustic Reflexes Thresholds (ART)(Probe ear):  RIGHT: Large ear canal volume consistent with TM perforation or patent PE tube. Ipsilateral ART -2000 Hz.  LEFT: Negative middle ear pressure, normal mobility, and ear canal volume. Ipsilateral ART -2000 Hz.    Pure Tone and Speech Audiometry:    Test Technique: Pure Tone Audiometry via TDH headphones and rechecked with insert phones  Test Reliability: good    RIGHT:  Moderate to mild  mixed hearing loss through 8000 Hz. Word Recognition score was excellent using recorded material (NU-6 10-word list ordered by difficulty).   LEFT: Normal to mild sensorineural hearing loss  sensorineural hearing loss through 8000 Hz. Word Recognition score was excellent using recorded material (NU-6 10-word list ordered by difficulty).     EVALUATION  See scanned Audiogram in \"Media\".    IMPRESSIONS:  Today's test results indicate slight decrease in right air conduction thresholds as compared to .    RECOMMENDATIONS:  Continue medical follow-up with physician.  Return for audiologic assessment in conjunction with otologic care or annually.   Implement communication strategies (utilizing visual cues/gestures; reducing " background noise and distance from desired source; increasing light to assist with visual cues; use of clear speech).     JOSE Landon  Doctorate Extern     SAMI Hope, CCC-A  Senior Clinical Audiologist    TIME: [id number]

## 2025-08-01 NOTE — PROGRESS NOTES
PATIENT ID  Chavez Calle IS A 44 y.o. male WHO PRESENTS FOR      HPI   This 44-year-old male is being seen in the office for gradual recheck of his ears due to cerumen is buildup in his right mastoid cavity and left ear.  Status post surgery on his right ear for cholesteatoma number years ago.  He is generally seen every 6 months for debridement.  He is also had upper respiratory tract congestive problems and is status post septoplasty by another provider.  He continues to smoke which complicates that problem.  He been counseled about use of saline as a nasal wash for his nose.  Also needs to stop smoking.  He had no drainage or pains from his ears.  There is been a difficulties with vertigo.      ROS    A 12 point ROS  has been reviewed and are negative for complaint except for what is stated in the history of present illness and /or for past medical history as noted in the EMR.     Medical History[1]    Current Medications[2]    Social History[3]    RX Allergies[4]     vitals were not taken for this visit.     PHYSICAL EXAM  GENERAL MIMI.EARANCE: Alert, in no acute distress, normal pitch and clarity of voice, well-developed and nourished, cooperative.     HEAD/FACE: Normocephalic, atraumatic, normal facial movements and strength, no no tenderness to palpation, no lesions noted.     SKIN: Normal turgor, no raised or ulcerative lesions, warm and dry to palpation.     EYES: Extraocular motions intact, no nystagmus noted, pupils equal and reactive to light and accommodation, no conjunctivitis.     EARS: Both external ears were within normal limits. Cerumen was noted in both ears with no signs of otorrhea. No mastoid tenderness was noted. See procedure note     NOSE: See procedure note     OROPHARYNX/ORAL CAVITY: Oropharynx is not inflamed and is without lesions, mucosa of the oral cavity is intact and without lesions, tongue is midline and mobile, no acute dental disease is noted, TMJs are mobile     NECK: No  lymphadenopathy is palpated, carotid pulses are intact, neck is supple with full range of motion, no thyroid abnormalities are noted, trachea is midline, no neck masses are palpated.     NEUROLOGIC/PSYCH; alert and oriented, cranial nerves are grossly intact, gait is without falling, no motor deficits are noted.     Patient ID: Chavez Calle is a 44 y.o. male.    Binocular microscopy    Date/Time: 8/6/2025 10:07 AM    Performed by: Kyler Escoto DMD, MD  Authorized by: Kyler Escoto DMD, MD    Consent:     Consent obtained:  Verbal    Consent given by:  Patient    Risks discussed:  Pain    Alternatives discussed:  No treatment and observation  Procedure details:     Indications: debridement    Post-procedure details:     Patient tolerance of procedure:  Tolerated well, no immediate complications  Comments:      Microscopic evaluation of both ears was done.  On the right-hand side there is a canal wall down mastoid cavity with ceruminous debris obstructive to the cavity.  This was removed with a combination of wax loop suction and alligator forceps.  This was fairly blocked up in the mastoid bowl area.  The tympanic membrane was intact once visualized.  No otorrhea was noted.  Left-hand side there is slightly stenotic canal with ceruminous buildup removed with wax loop and suctioning.  The tympanic Escoto was intact with no middle ear disease.      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  There is no signs of otorrhea or inflammation at this point.  He does have some significant  buildup in the mastoid bowl by his 6-month follow-up.  There is been no discomfort and has been no subjective changes in hearing.  He will be seen again in 6 months with an audiogram at that time.  Any difficulties with pain, otorrhea or new symptoms such as vertigo she contact the office.  He is using inspire for sleep apnea which has been helpful and his nasal congestive issues have been improved with the surgery that he underwent.  Anything that seems to develop with swallowing or voice she should also alert the office.         [1]   Past Medical History:  Diagnosis Date    Personal history of other diseases of the respiratory system     History of asthma    Personal history of other drug therapy     COVID-19 vaccine series completed   [2]   Current Outpatient Medications:     amLODIPine (Norvasc) 5 mg tablet, 1 tablet Orally Once a day for 30 days, Disp: , Rfl:     atorvastatin (Lipitor) 40 mg tablet, Take 1 tablet (40 mg) by mouth once daily., Disp: , Rfl:     DULoxetine 40 mg DR capsule, Take 1 capsule (40 mg) by mouth once daily., Disp: , Rfl:     tamsulosin (Flomax) 0.4 mg 24 hr capsule, Take 1 capsule (0.4 mg) by mouth once daily., Disp: , Rfl:     traZODone (Desyrel) 50 mg tablet, Take 1 tablet (50 mg) by mouth as needed at bedtime for sleep., Disp: , Rfl:   [3]   Social History  Tobacco Use    Smoking status: Every Day     Types: Cigarettes    Smokeless tobacco: Never   Substance Use Topics    Alcohol use: Yes    Drug use: Yes     Types: Marijuana   [4] No Known Allergies

## 2025-08-06 ENCOUNTER — APPOINTMENT (OUTPATIENT)
Dept: OTOLARYNGOLOGY | Facility: CLINIC | Age: 45
End: 2025-08-06
Payer: MEDICARE

## 2025-08-06 VITALS — WEIGHT: 256 LBS | HEIGHT: 76 IN | BODY MASS INDEX: 31.17 KG/M2

## 2025-08-06 DIAGNOSIS — H61.22 CERUMEN DEBRIS ON TYMPANIC MEMBRANE OF LEFT EAR: ICD-10-CM

## 2025-08-06 DIAGNOSIS — H95.191 ENCOUNTER FOR DEBRIDEMENT OF RIGHT POSTMASTOIDECTOMY CAVITY: ICD-10-CM

## 2025-08-06 DIAGNOSIS — H90.A22 SENSORINEURAL HEARING LOSS (SNHL) OF LEFT EAR WITH RESTRICTED HEARING OF RIGHT EAR: ICD-10-CM

## 2025-08-06 DIAGNOSIS — H61.23 BILATERAL IMPACTED CERUMEN: Primary | ICD-10-CM

## 2025-08-06 DIAGNOSIS — H90.A31 MIXED CONDUCTIVE AND SENSORINEURAL HEARING LOSS OF RIGHT EAR WITH RESTRICTED HEARING OF LEFT EAR: ICD-10-CM

## 2025-08-06 DIAGNOSIS — Z72.0 TOBACCO ABUSE: ICD-10-CM

## 2025-08-06 PROCEDURE — 69222 CLEAN OUT MASTOID CAVITY: CPT | Performed by: OTOLARYNGOLOGY

## 2025-08-06 PROCEDURE — 99213 OFFICE O/P EST LOW 20 MIN: CPT | Performed by: OTOLARYNGOLOGY

## 2025-08-06 PROCEDURE — 3008F BODY MASS INDEX DOCD: CPT | Performed by: OTOLARYNGOLOGY

## 2026-02-09 ENCOUNTER — APPOINTMENT (OUTPATIENT)
Dept: OTOLARYNGOLOGY | Facility: CLINIC | Age: 46
End: 2026-02-09
Payer: MEDICARE

## 2026-02-09 ENCOUNTER — APPOINTMENT (OUTPATIENT)
Dept: AUDIOLOGY | Facility: CLINIC | Age: 46
End: 2026-02-09
Payer: MEDICARE